# Patient Record
(demographics unavailable — no encounter records)

---

## 2025-01-22 NOTE — HISTORY OF PRESENT ILLNESS
[FreeTextEntry1] : 65-year-old female with past medical history of osteoporosis/hyperlipidemia/prediabetes/subclinical hypothyroidism came for evaluation of osteoporosis  Patient reports that she was diagnosed with osteoporosis at the age of 37 after she delivered twin babies.  She delivered her second twin baby after 15 hours of delivery of first 20.  Afterwards, her menses never came.  She was able to breast-feed her twins for 6-month.  Around the same time she was diagnosed with osteoporosis in the [facility].  She was never given any treatment for osteoporosis.    She is currently taking calcium 1200 mg daily and Vitamin D 600 mg daily.   She had a DEXA scan in July 2024 that showed L1-L4 T-score is equal to -3.5 and left femoral neck T-score is equal to -1.9. She started Alendronate in Aug 2024 but she was having side effects of constipation, fatigue, heartburn, so stopped in Oct 2024.  History of Fractures: No Age at menopause: Menopause happened at 37. History of eating disorders, malnutrition or problems with nutrient absorption: Reports she has low appetite and she does not eat a lot, sometimes only eat half a banana the whole day. She was also diagnosed with  subclinical hypothyroidism in June 2024 when the TSH level was 5.89.  The repeat TSH in September 2024 showed 9.55 and therefore the levothyroxine 25 mcg was started by the PCP. Patient ran out of levothyroxine prescription 2 weeks ago.  She states she does not want levothyroxine as she was having  memory loss,  fatigue, constipation when she was taking levothyroxine.  She denies any other hypothyroid symptoms at this time. History of steroid use:  Denies History of use of anti-convulsant: Denies History of diuretic use: Recent falls or unstable gait: She had a fall in [place] in July 2024 but did not had a fracture Loss of height: She thinks she has lost height, however per chart note her height has been stable to 4 feet 9 inches Family history of osteoporosis or fractures: No family hx of osteoporosis Past medical treatment for osteoporosis: Alendronate for 3 months in 2024. Current treatment of osteoporosis: None Most recent DXA: As above Prior DXA (if relevant): Not available Vitamin D intake:  Yes  Calcium intake: Yes Dental care: Last year dentist few years ago.  Has no plan to get any dental treatments  ROS: Reports weight loss Denies history of fractures. Denies back or long bone pain.  Denies falls or gait instability.  Denies flank pain.  Denies hematuria. Denies smoking. Denies excessive alcohol intake.  Denies history of steroid use.  Denies malabsorption. Denies history of hyperthyroidism/hyperparathyroidism. Denies history of kidney stones.

## 2025-01-29 NOTE — ASSESSMENT
[FreeTextEntry1] : The cause of osteoporosis most likely an early menopause as the hormonal panel including PTH is within normal limit. The DEXA scan in July 2024 shows that patient had severe osteoporosis at L1-L4 level with a T-score of -3.5. - will plan to start anabolic agent romosozumab depending on coverage given severe osteoporosis in March 2025 - Patient without history of hypercalcemia or elevated alkaline phosphatase. No history of prior radiation or malignancy. No history of kidney stones or renal insufficiency. -Last labs shows patient has severe vitamin D deficiency, Repeat vitamin D level is around 27.  Patient has not started taking the 50,000 international unit ergocalciferol weekly.  Has a plan to started this week. - Discussed with patient to take vitamin D 50,000 international unit every week for 6 weeks - continue calcium 1200 mg daily and once she finished 50,000 national units of vitamin D she should start taking 1000 international unit of vitamin D.  Subclinical hypothyroidism: Patient was started on levothyroxine 25 mcg daily when the TSH was 9. She has stopped taking levothyroxine 25 mcg 2 weeks ago. Check TFTs and TPO antibodies. Will assess the need of starting levothyroxine based upon the TFTs.  RTC  March 2025.

## 2025-01-29 NOTE — HISTORY OF PRESENT ILLNESS
[FreeTextEntry1] :  This visit was provided via telehealth using real-time 2-way audio visual technology. The patient was located at home at the time of the visit. The provider, DEJON SORIA, was located at the medical office located in [address] [place] [address] at the time of the visit. The patient and Provider participated in the telehealth encounter. Verbal consent given by the patient, self. This visit has been changed to audio visit due to technical difficulties.  65-year-old female with past medical history of osteoporosis/hyperlipidemia/prediabetes/subclinical hypothyroidism is scheduled for osteoporosis follow-up for the discussion of lab results.  Patient was diagnosed with osteoporosis at the age of 37 after she delivered twin babies. She delivered her second twin baby after 15 hours of delivery of first 20. Afterwards, her menses never came. She was able to breast-feed her twins for 6-month. Around the same time she was diagnosed with osteoporosis in the [facility]. She was never given any treatment for osteoporosis.  She is currently taking calcium 1200 mg daily.  She has not started the vitamin D 50,000 international unit that was recommended on last visit. She had a DEXA scan in July 2024 that showed L1-L4 T-score is equal to -3.5 and left femoral neck T-score is equal to -1.9. She started Alendronate in Aug 2024 but she was having side effects of constipation, fatigue, heartburn, so stopped in Oct 2024. History of Fractures: No Age at menopause: Menopause happened at 37. History of eating disorders, malnutrition or problems with nutrient absorption: Reports she has low appetite and she does not eat a lot, sometimes only eat half a banana the whole day. She was also diagnosed with subclinical hypothyroidism in June 2024 when the TSH level was 5.89. The repeat TSH in September 2024 showed 9.55 and therefore the levothyroxine 25 mcg was started by the PCP. Patient ran out of levothyroxine prescription and is stopped taking levothyroxine for many weeks.  Most recent TSH is around 7.  Patient's PCP has sent the prescription of levothyroxine to the pharmacy.  She has a plan to  the prescription and start the levothyroxine. She denies any other hypothyroid symptoms at this time. History of steroid use: Denies History of use of anti-convulsant: Denies History of diuretic use: Recent falls or unstable gait: She had a fall in [place] in July 2024 but did not had a fracture Loss of height: She thinks she has lost height, however per chart note her height has been stable to 4 feet 9 inches Family history of osteoporosis or fractures: No family hx of osteoporosis Past medical treatment for osteoporosis: Alendronate for 3 months in 2024. Current treatment of osteoporosis: None Most recent DXA: As above Prior DXA (if relevant): Not available Vitamin D intake: Yes Calcium intake: Yes Dental care: Last year dentist few years ago. Has no plan to get any dental treatments  ROS: Reports weight loss Denies history of fractures. Denies back or long bone pain. Denies falls or gait instability. Denies flank pain. Denies hematuria. Denies smoking. Denies excessive alcohol intake. Denies history of steroid use. Denies malabsorption. Denies history of hyperthyroidism/hyperparathyroidism. Denies history of kidney stones.  Patient has vitamin D deficiency based upon the previous lab results.  Patient was advised to start taking 50,000 national units vitamin D weekly.  However she has not started taking yet.

## 2025-03-19 NOTE — PHYSICAL EXAM
[Alert] : alert [Well Nourished] : well nourished [No Acute Distress] : no acute distress [Normal Sclera/Conjunctiva] : normal sclera/conjunctiva [No LAD] : no lymphadenopathy [Supple] : the neck was supple [Thyroid Not Enlarged] : the thyroid was not enlarged [No Thyroid Nodules] : no palpable thyroid nodules [No Respiratory Distress] : no respiratory distress [No Edema] : no peripheral edema [Pedal Pulses Normal] : the pedal pulses are present [Normal Bowel Sounds] : normal bowel sounds [Not Tender] : non-tender [Not Distended] : not distended [Soft] : abdomen soft [Normal Anterior Cervical Nodes] : no anterior cervical lymphadenopathy [Normal Posterior Cervical Nodes] : no posterior cervical lymphadenopathy [No Spinal Tenderness] : no spinal tenderness [Spine Straight] : spine straight [No Stigmata of Cushings Syndrome] : no stigmata of Cushings Syndrome [Normal Gait] : normal gait [Normal Strength/Tone] : muscle strength and tone were normal [No Rash] : no rash [Normal Reflexes] : deep tendon reflexes were 2+ and symmetric [No Tremors] : no tremors [Oriented x3] : oriented to person, place, and time [Acanthosis Nigricans] : no acanthosis nigricans

## 2025-03-19 NOTE — ASSESSMENT
[FreeTextEntry1] : The cause of osteoporosis most likely an early menopause as the hormonal panel including PTH is within normal limit. The DEXA scan in July 2024 shows that patient had severe osteoporosis at L1-L4 level with a T-score of -3.5. -Her vitamin D levels were low and therefore patient was started on 50,000 national units of vitamin D.  Patient completed 6 weeks course. Will check the repeat vitamin D level. Patient wants to start  romosozumab in July after she comes from vacation.  Will order romosozumab at that time - Patient without history of MI and stroke. - Discussed with patient to continue calcium 1200 mg daily and start taking 1000 international unit of vitamin D.  Subclinical hypothyroidism: Patient was started on levothyroxine 25 mcg daily when the TSH was 9. She has stopped taking levothyroxine 25 mcg 2 weeks ago. Repeat TSH in January was 7.68 Advised patient to restart the levothyroxine 25 mcg daily. Discussed the appropriate administration technique.  Hyperlipidemia: Patient has elevated LDL cholesterol of 142 mg/dL, worsened from the past Advised to start atorvastatin 20 mg daily.  RTC in July 2025

## 2025-03-19 NOTE — HISTORY OF PRESENT ILLNESS
[FreeTextEntry1] : 65-year-old female with past medical history of osteoporosis/hyperlipidemia/prediabetes/subclinical hypothyroidism is scheduled for osteoporosis  and hypothyroidism follow up  Osteoporosis Hx: Patient was diagnosed with osteoporosis at the age of 37 after she delivered twin babies. She delivered her second twin baby after 15 hours of delivery of first twin. Afterwards, her menses never came. She was able to breast-feed her twins for 6-month. Around the same time she was diagnosed with osteoporosis in the [facility]. She was never given any treatment for osteoporosis at that time. However, took alendronate for 3 months in 2024 but developed severe GERD therefore she stopped it.   She is currently taking calcium 1200 mg daily. She has completed vitamin D 50,000 international units however did not start vitamin D 1000 IU daily.   DEXA scan in July 2024 that showed L1-L4 T-score is equal to -3.5 and left femoral neck T-score is equal to -1.9. She started Alendronate in Aug 2024 but she was having side effects of constipation, fatigue, heartburn, so stopped in Oct 2024. Denies history of Fractures, history of eating disorders, malnutrition or problems with nutrient absorption, hx of steroid use, anticonvulsant use. She had one fall in [place] in 2024 but did not have a fracture. She has not lost height. She denies family hx of osteoporosis. She has no plan for dental treatment. Reports she has low appetite, and she does not eat a lot and is able to lose weight.   Primary Subclinical Hypothyroidism: She was also diagnosed with subclinical hypothyroidism in June 2024 when the TSH level was 5.89. The repeat TSH in September 2024 showed 9.55 and therefore the levothyroxine 25 mcg was started by the PCP.  She took the levothyroxine intermittently. States her last dose of levothyroxine was a week ago.  Most recent TSH in Jan 2025 is 7.68    ROS: Reports weight loss Denies history of fractures. Denies back or long bone pain. Denies falls or gait instability. Denies flank pain. Denies hematuria. Denies smoking. Denies excessive alcohol intake. Denies history of steroid use.

## 2025-04-16 NOTE — HISTORY OF PRESENT ILLNESS
[FreeTextEntry1] :  This visit was provided via telehealth using real-time 2-way audio visual technology. The patient was located at home at the time of the visit. The provider, DEJON SORIA, was located at the medical office located in [address] [place] [address] at the time of the visit. The patient and Provider participated in the telehealth encounter. Verbal consent given by the patient, self.  This visit was changed to audio health due to technical issues  65-year-old female with past medical history of osteoporosis/hyperlipidemia/prediabetes/subclinical hypothyroidism is scheduled for osteoporosis and hypothyroidism follow up  Osteoporosis Hx: Patient was diagnosed with osteoporosis at the age of 37 after she delivered twin babies. She delivered her second twin baby after 15 hours of delivery of first twin. Afterwards, her menses never came. She was able to breast-feed her twins for 6-month. Around the same time she was diagnosed with osteoporosis in the [facility]. She was never given any treatment for osteoporosis at that time. However, took alendronate for 3 months in 2024 but developed severe GERD therefore she stopped it.   DEXA scan in July 2024 that showed L1-L4 T-score is equal to -3.5 and left femoral neck T-score is equal to -1.9. She started Alendronate in Aug 2024 but she was having side effects of constipation, fatigue, heartburn, so stopped in Oct 2024. Denies history of Fractures, history of eating disorders, malnutrition or problems with nutrient absorption, hx of steroid use, anticonvulsant use. She had one fall in [place] in 2024 but did not have a fracture. She has not lost height. She denies family hx of osteoporosis. She has no plan for dental treatment. Reports she has low appetite, and she does not eat a lot and is able to lose weight. On last visit.  It was discussed that patient should start romosozumab.  Patient is now agreeable to start romosozumab.    Primary Subclinical Hypothyroidism: She was also diagnosed with subclinical hypothyroidism in June 2024 when the TSH level was 5.89. The repeat TSH in September 2024 showed 9.55 and therefore the levothyroxine 25 mcg was started by the PCP. She is taking the levothyroxine 25 mcg every day with appropriate administration technique. Patient reports that she just came from the vacation and now feeling extremely tired and have somnolence Most recent TSH in Jan 2025 is 7.68  Patient also had vitamin D deficiency.  Currently she is taking vitamin D 1000 international unit daily   ROS: Feels sleepy Feels fatigue  Reports weight loss Denies history of fractures. Denies back or long bone pain. Denies falls or gait instability. Denies flank pain. Denies hematuria. Denies smoking. Denies excessive alcohol intake. Denies history of steroid use.  Patient has prediabetes last A1c was 5.7% Patient's LDL cholesterol was 142, therefore patient was advised to start atorvastatin 20 mg daily

## 2025-04-16 NOTE — HISTORY OF PRESENT ILLNESS
[FreeTextEntry1] :  This visit was provided via telehealth using real-time 2-way audio visual technology. The patient was located at home at the time of the visit. The provider, DEJON SORIA, was located at the medical office located in [address] [place] [address] at the time of the visit. The patient and Provider participated in the telehealth encounter. Verbal consent given by the patient, self.  This visit was changed to audio health due to technical issues  65-year-old female with past medical history of osteoporosis/hyperlipidemia/prediabetes/subclinical hypothyroidism is scheduled for osteoporosis and hypothyroidism follow up  Osteoporosis Hx: Patient was diagnosed with osteoporosis at the age of 37 after she delivered twin babies. She delivered her second twin baby after 15 hours of delivery of first twin. Afterwards, her menses never came. She was able to breast-feed her twins for 6-month. Around the same time she was diagnosed with osteoporosis in the [facility]. She was never given any treatment for osteoporosis at that time. However, took alendronate for 3 months in 2024 but developed severe GERD therefore she stopped it.   DEXA scan in July 2024 that showed L1-L4 T-score is equal to -3.5 and left femoral neck T-score is equal to -1.9. She started Alendronate in Aug 2024 but she was having side effects of constipation, fatigue, heartburn, so stopped in Oct 2024. Denies history of Fractures, history of eating disorders, malnutrition or problems with nutrient absorption, hx of steroid use, anticonvulsant use. She had one fall in [place] in 2024 but did not have a fracture. She has not lost height. She denies family hx of osteoporosis. She has no plan for dental treatment. Reports she has low appetite, and she does not eat a lot and is able to lose weight. On last visit.  It was discussed that patient should start romosozumab.  Patient is now agreeable to start romosozumab.    Primary Subclinical Hypothyroidism: She was also diagnosed with subclinical hypothyroidism in June 2024 when the TSH level was 5.89. The repeat TSH in September 2024 showed 9.55 and therefore the levothyroxine 25 mcg was started by the PCP. She is taking the levothyroxine 25 mcg every day with appropriate administration technique. Patient reports that she just came from the vacation and now feeling extremely tired and have somnolence Most recent TSH in Jan 2025 is 7.68  Patient also had vitamin D deficiency.  Currently she is taking vitamin D 1000 international unit daily   ROS: Feels sleepy Feels fatigue  Reports weight loss Denies history of fractures. Denies back or long bone pain. Denies falls or gait instability. Denies flank pain. Denies hematuria. Denies smoking. Denies excessive alcohol intake. Denies history of steroid use.  Patient has prediabetes last A1c was 5.7% Patient's LDL cholesterol was 142, therefore patient was advised to start atorvastatin 20 mg daily

## 2025-04-16 NOTE — ASSESSMENT
[FreeTextEntry1] : The cause of osteoporosis most likely an early menopause as the hormonal panel including PTH is within normal limit. The DEXA scan in July 2024 shows that patient had severe osteoporosis at L1-L4 level with a T-score of -3.5. -Her vitamin D levels were low and therefore patient was started on 50,000 national units of vitamin D. Patient completed 6 weeks course.  Now patient is taking vitamin D 1000 every day. Will check the vitamin D level now before administering romosozumab once the vitamin D levels are back, and if it is normal will order romosozumab - Discussed with patient to continue calcium 1200 mg daily and continue 1000 international unit of vitamin D.  Subclinical hypothyroidism: Repeat TSH in January was 7.68 Continue levothyroxine 25 mcg daily. Patient had symptoms of fatigue and somnolence, will check the TSH to evaluate if it is uncontrolled. Discussed the appropriate administration technique.  Hyperlipidemia: Patient has elevated LDL cholesterol of 142 mg/dL, worsened from the past Continue atorvastatin 20 mg daily.  RTC in July 2025.